# Patient Record
Sex: MALE | Race: BLACK OR AFRICAN AMERICAN
[De-identification: names, ages, dates, MRNs, and addresses within clinical notes are randomized per-mention and may not be internally consistent; named-entity substitution may affect disease eponyms.]

---

## 2017-09-21 ENCOUNTER — HOSPITAL ENCOUNTER (INPATIENT)
Dept: HOSPITAL 74 - YASAS | Age: 32
LOS: 4 days | Discharge: HOME | End: 2017-09-25
Attending: INTERNAL MEDICINE | Admitting: INTERNAL MEDICINE
Payer: COMMERCIAL

## 2017-09-21 VITALS — BODY MASS INDEX: 26.6 KG/M2

## 2017-09-21 DIAGNOSIS — F10.230: Primary | ICD-10-CM

## 2017-09-21 DIAGNOSIS — F17.210: ICD-10-CM

## 2017-09-21 DIAGNOSIS — F16.20: ICD-10-CM

## 2017-09-21 LAB
APPEARANCE UR: CLEAR
BILIRUB UR STRIP.AUTO-MCNC: NEGATIVE MG/DL
COLOR UR: YELLOW
KETONES UR QL STRIP: NEGATIVE
LEUKOCYTE ESTERASE UR QL STRIP.AUTO: NEGATIVE
NITRITE UR QL STRIP: NEGATIVE
PH UR: 5 [PH] (ref 5–8)
PROT UR QL STRIP: NEGATIVE
PROT UR QL STRIP: NEGATIVE
RBC # UR STRIP: NEGATIVE /UL
UROBILINOGEN UR STRIP-MCNC: NEGATIVE MG/DL (ref 0.2–1)

## 2017-09-21 PROCEDURE — HZ2ZZZZ DETOXIFICATION SERVICES FOR SUBSTANCE ABUSE TREATMENT: ICD-10-PCS | Performed by: INTERNAL MEDICINE

## 2017-09-21 RX ADMIN — Medication SCH MG: at 22:20

## 2017-09-21 RX ADMIN — BACITRACIN ZINC SCH GM: 500 OINTMENT TOPICAL at 22:20

## 2017-09-21 NOTE — HP
CIWA Score





- CIWA Score


Nausea/Vomitin-Mild Nausea/No Vomiting


Muscle Tremors: 2


Anxiety: 3


Agitation: 1-Slight > Activity


Paroxysmal Sweats: 2


Orientation: 1-Uncertain about Date


Tacttile Disturbances: 1-Very Mild Itch/Numbness


Auditory Disturbances: 0-None


Visual Disturbances: 1-Very Mild Sensitivity


Headache: 1-Very Mild


CIWA-Ar Total Score: 13





Admission ROS [facility]





- HPI


Chief Complaint: 


WITHDRAWAL SYMPTOMS 


Allergies/Adverse Reactions: 


 Allergies











Allergy/AdvReac Type Severity Reaction Status Date / Time


 


No Known Allergies Allergy   Verified 12 23:39











History of Present Illness: 


32 Y.O. MAN WITH A HISTORY OF ALCOHOL AND PCP DEPENDENCE IS HERE FOR HIS FIRST 

ADMISSION INTO DETOX.  HE WAS LAST HERE FOR REHAB IN . REPORTS HIS LONGEST 

PERIOD SOBER HAS BEEN 1.5 YEARS.  


Exam Limitations: No Limitations





- Ebola screening


Have you traveled outside of the country in the last 21 days: No


Have you had contact with anyone from an Ebola affected area: No


Have you been sick,other than usual withdrawal symptoms: No


Do you have a fever: No





- Review of Systems


Constitutional: Loss of Appetite, Unintentional Wgt. Loss


EENT: reports: Tearing, Nose Congestion


Respiratory: reports: Cough


Cardiac: reports: No Symptoms Reported


GI: reports: No Symptoms Reported


: reports: No Symptoms Reported


Musculoskeletal: reports: Back Pain


Integumentary: reports: No Symptoms Reported


Neuro: reports: Tremors


Endocrine: reports: No Symptoms Reported


Hematology: reports: No Symptoms Reported


Psychiatric: reports: Judgement Intact, Mood/Affect Appropiate


Other Systems: Reviewed and Negative





Patient History





- Patient Medical History


Hx Anemia: No


Hx Asthma: No


Hx Chronic Obstructive Pulmonary Disease (COPD): No


Hx Cancer: No


Hx Cardiac Disorders: No


Hx Congestive Heart Failure: No


Hx Hypertension: No


Hx Hypercholesterolemia: No


Hx Pacemaker: No


HX Cerebrovascular Accident: No


Hx Seizures: No


Hx Dementia: No


Hx Diabetes: No


Hx Gastrointestinal Disorders: No


Hx Liver Disease: No


Hx Genitourinary Disorders: No


Hx Sexually Transmitted Disorders: No


Hx Renal Disease (ESRD): No


Hx Thyroid Disease: No


Hx Human Immunodeficiency Virus (HIV): No


Hx Hepatitis C: No


Hx Depression: No


Hx Suicide Attempt: No


Hx Bipolar Disorder: No


Hx Schizophrenia: No





- Patient Surgical History


Past Surgical History: Yes


Hx Neurologic Surgery: Yes (7 YRS BACK H/O EVACUATION OF SDH.)


Hx Cataract Extraction: No


Hx Cardiac Surgery: No


Hx Lung Surgery: No


Hx Breast Surgery: No


Hx Breast Biopsy: No


Hx Abdominal Surgery: No


Hx Appendectomy: No


Hx Cholecystectomy: No


Hx Genitourinary Surgery: No


Hx  Section: No


Hx Orthopedic Surgery: No


Other Surgical History: surg to left side brain to remove clot; 4 yrs ago


Anesthesia Reaction: No





- PPD History


Previous Implant?: Yes


Documented Results: Negative w/o proof


Date: 13


PPD to be Administered?: Yes





- Reproductive History


Patient is a Female of Child Bearing Age (11 -55 yrs old): No





- Smoking Cessation


Smoking history: Former smoker


Have you smoked in the past 12 months: No


Aproximately how many cigarettes per day: 7


Hx Chewing Tobacco Use: No


Initiated information on smoking cessation: Yes


'Breaking Loose' booklet given: 17





- Substance & Tx. History


Hx Alcohol Use: Yes


Hx Substance Use: Yes


Substance Use Type: Alcohol


Hx Substance Use Treatment: Yes (REHAB: 2013; NEVER BEEN TO DETOX )





- Substances Abused


  ** Alcohol


Route: Oral


Frequency: Daily


Amount used: 6 24OZ CANS OF BEER


Age of first use: 20


Date of Last Use: 17





  ** PCP


Route: Smoking


Frequency: Daily


Amount used: $10


Age of first use: 29


Date of Last Use: 17





Family Disease History





- Family Disease History


Family History: Denies





Admission Physical Exam [facility]





- Vital Signs


Vital Signs: 


 Vital Signs - 24 hr











  17





  19:00


 


Temperature 96.9 F L


 


Pulse Rate 83


 


Respiratory 18





Rate 


 


Blood Pressure 154/82














- Physical


General Appearance: Yes: Within Normal Limits


HEENTM: Yes: Normocephalic, Normal Voice, PHONG


Respiratory: Yes: Chest Non-Tender, Lungs Clear, Normal Breath Sounds, No 

Respiratory Distress, No Accessory Muscle Use


Neck: Yes: No masses,lesions,Nodules, Trachea in good position


Breast: Yes: Breast Exam Deferred


Cardiology: Yes: Regular Rhythm, Regular Rate


Abdominal: Yes: Normal Bowel Sounds, Non Tender, Flat, Soft


Genitourinary: Yes: Other (NO COMPLAINTS REPORTED)


Back: Yes: Normal Inspection


Musculoskeletal: Yes: full range of Motion, Gait Steady


Extremities: Yes: Normal Inspection, Normal Range of Motion, Tremors


Neurological: Yes: Alert, Motor Strength 5/5, Normal Mood/Affect, Normal 

Response


Integumentary: Yes: Normal Color, Dry


Lymphatic: Yes: Within Normal Limits





- Diagnostic


(1) Nicotine dependence


Current Visit: Yes   Status: Chronic   Qualifiers: 


     Nicotine product type: cigarettes     Substance use status: uncomplicated 

       Qualified Code(s): F17.210 - Nicotine dependence, cigarettes, 

uncomplicated  





(2) Alcohol dependence with uncomplicated withdrawal


Current Visit: Yes   Status: Chronic





(3) PCP dependence


Current Visit: Yes   Status: Chronic








Cleared for Admission [facility]





- Detox or Rehab


[facility] Level of Care: Medically Managed


Detox Regimen/Protocol: Librium





BHS Breath Alcohol Content


Breath Alcohol Content: 0.031





Urine Drug Screen





- Results


Drug Screen Negative: No


Urine Drug Screen Results: PCP-Phencyclidine

## 2017-09-22 LAB
ALBUMIN SERPL-MCNC: 3.9 G/DL (ref 3.4–5)
ALP SERPL-CCNC: 58 U/L (ref 45–117)
ALT SERPL-CCNC: 48 U/L (ref 12–78)
ANION GAP SERPL CALC-SCNC: 7 MMOL/L (ref 8–16)
AST SERPL-CCNC: 51 U/L (ref 15–37)
BILIRUB SERPL-MCNC: 0.5 MG/DL (ref 0.2–1)
CALCIUM SERPL-MCNC: 9.1 MG/DL (ref 8.5–10.1)
CO2 SERPL-SCNC: 29 MMOL/L (ref 21–32)
CREAT SERPL-MCNC: 0.7 MG/DL (ref 0.7–1.3)
DEPRECATED RDW RBC AUTO: 14.3 % (ref 11.9–15.9)
GLUCOSE SERPL-MCNC: 78 MG/DL (ref 74–106)
HIV 1 & 2 AB: NEGATIVE
HIV 1 AGP24: NEGATIVE
MCH RBC QN AUTO: 22.6 PG (ref 25.7–33.7)
MCHC RBC AUTO-ENTMCNC: 30.7 G/DL (ref 32–35.9)
MCV RBC: 73.6 FL (ref 80–96)
PLATELET # BLD AUTO: 237 K/MM3 (ref 134–434)
PMV BLD: 9.3 FL (ref 7.5–11.1)
PROT SERPL-MCNC: 6.5 G/DL (ref 6.4–8.2)
SP GR UR: 1.02 (ref 1–1.02)
WBC # BLD AUTO: 6.4 K/MM3 (ref 4–10)

## 2017-09-22 RX ADMIN — Medication SCH MG: at 22:25

## 2017-09-22 RX ADMIN — BACITRACIN ZINC SCH GM: 500 OINTMENT TOPICAL at 10:44

## 2017-09-22 RX ADMIN — Medication SCH TAB: at 10:43

## 2017-09-22 NOTE — PN
[facility] CIWA





- CIWA Score


Nausea/Vomitin-No Nausea/No Vomiting


Muscle Tremors: 4-Moderate,w/Arms Extend


Anxiety: 4-Mod. Anxious/Guarded


Agitation: 4-Moderately Restless


Paroxysmal Sweats: 1-Minimal Palms Moist


Orientation: 0-Oriented


Tacttile Disturbances: 3-Moderate Itch/Numb/Burn


Auditory Disturbances: 0-None


Visual Disturbances: 0-None


Headache: 0-None Present


CIWA-Ar Total Score: 16





BHS Progress Note (SOAP)


Subjective: 


ANXIETY,SWEATS, TREMORS, INTERMITTENT SLEEP.


Objective: 





17 12:40


 Vital Signs











Temperature  96.0 F L  17 10:34


 


Pulse Rate  60   17 10:34


 


Respiratory Rate  18   17 10:34


 


Blood Pressure  116/74   17 10:34


 


O2 Sat by Pulse Oximetry (%)      








 Laboratory Last Values











WBC  6.4 K/mm3 (4.0-10.0)   17  07:40    


 


RBC  5.22 M/mm3 (4.00-5.60)   17  07:40    


 


Hgb  11.8 GM/dL (11.7-16.9)   17  07:40    


 


Hct  38.4 % (35.4-49)   17  07:40    


 


MCV  73.6 fl (80-96)  L  17  07:40    


 


MCH  22.6 pg (25.7-33.7)  L  17  07:40    


 


MCHC  30.7 g/dl (32.0-35.9)  L  17  07:40    


 


RDW  14.3 % (11.9-15.9)   17  07:40    


 


Plt Count  237 K/MM3 (134-434)   17  07:40    


 


MPV  9.3 fl (7.5-11.1)   17  07:40    


 


Sodium  140 mmol/L (136-145)   17  07:40    


 


Potassium  3.9 mmol/L (3.5-5.1)   17  07:40    


 


Chloride  104 mmol/L ()   17  07:40    


 


Carbon Dioxide  29 mmol/L (21-32)   17  07:40    


 


Anion Gap  7  (8-16)  L  17  07:40    


 


BUN  11 mg/dL (7-18)   17  07:40    


 


Creatinine  0.7 mg/dL (0.7-1.3)   17  07:40    


 


Creat Clearance w eGFR  > 60  (>60)   17  07:40    


 


Random Glucose  78 mg/dL ()   17  07:40    


 


Calcium  9.1 mg/dL (8.5-10.1)   17  07:40    


 


Total Bilirubin  0.5 mg/dL (0.2-1.0)  D 17  07:40    


 


AST  51 U/L (15-37)  H  17  07:40    


 


ALT  48 U/L (12-78)   17  07:40    


 


Alkaline Phosphatase  58 U/L ()   17  07:40    


 


Total Protein  6.5 g/dl (6.4-8.2)   17  07:40    


 


Albumin  3.9 g/dl (3.4-5.0)   17  07:40    


 


Urine Color  Yellow   17  21:59    


 


Urine Appearance  Clear   17  21:59    


 


Urine pH  5.0  (5.0-8.0)   17  21:59    


 


Ur Specific Gravity  1.025  (1.005-1.025)   17  21:59    


 


Urine Protein  Negative  (NEGATIVE)   17  21:59    


 


Urine Glucose (UA)  Negative  (NEGATIVE)   17  21:59    


 


Urine Ketones  Negative  (NEGATIVE)   17  21:59    


 


Urine Blood  Negative  (NEGATIVE)   17  21:59    


 


Urine Nitrite  Negative  (NEGATIVE)   17  21:59    


 


Urine Bilirubin  Negative  (NEGATIVE)   17  21:59    


 


Urine Urobilinogen  Negative mg/dL (0.2-1.0)   17  21:59    


 


RPR Titer  Nonreactive  (NONREACTIVE)   17  07:40    


 


HIV 1&2 Antibody Screen  Negative   17  07:40    


 


HIV P24 Antigen  Negative   17  07:40    











Assessment: 





17 12:40


WITHDRAWAL SX


Plan: 


CONTINUE DETOX

## 2017-09-22 NOTE — EKG
Test Reason : 

Blood Pressure : ***/*** mmHG

Vent. Rate : 063 BPM     Atrial Rate : 063 BPM

   P-R Int : 146 ms          QRS Dur : 092 ms

    QT Int : 390 ms       P-R-T Axes : 058 073 057 degrees

   QTc Int : 399 ms

 

NORMAL SINUS RHYTHM

VOLTAGE CRITERIA FOR LEFT VENTRICULAR HYPERTROPHY

ABNORMAL ECG

NO PREVIOUS ECGS AVAILABLE

Confirmed by KOJO SELF MD ([id number]) on 9/22/2017 9:20:13 AM

 

Referred By: Madan Glasgow           Confirmed By:KOJO SELF MD

## 2017-09-23 RX ADMIN — Medication SCH TAB: at 10:40

## 2017-09-23 RX ADMIN — Medication SCH MG: at 22:25

## 2017-09-23 RX ADMIN — BACITRACIN ZINC SCH GM: 500 OINTMENT TOPICAL at 10:40

## 2017-09-23 NOTE — PN
[facility] CIWA





- CIWA Score


Nausea/Vomitin-No Nausea/No Vomiting


Muscle Tremors: 4-Moderate,w/Arms Extend


Anxiety: 4-Mod. Anxious/Guarded


Agitation: 3


Paroxysmal Sweats: 3


Orientation: 0-Oriented


Tacttile Disturbances: 0-None


Auditory Disturbances: 2-Mild Harshness/Frighten


Visual Disturbances: 3-Moderate Sensitivity


Headache: 0-None Present


CIWA-Ar Total Score: 19





BHS Progress Note (SOAP)


Subjective: 


Tremors, Fatigue, Sweating, Anxious.


Objective: 


PT. A & O X 3. NO ACUTE DISTRESS.





17 13:48


 Vital Signs











Temperature  96.5 F L  17 09:46


 


Pulse Rate  65   17 09:46


 


Respiratory Rate  18   17 09:46


 


Blood Pressure  112/43   17 09:46


 


O2 Sat by Pulse Oximetry (%)      








 Laboratory Tests











  17





  21:59 07:40 07:40


 


WBC    6.4


 


RBC    5.22


 


Hgb    11.8


 


Hct    38.4


 


MCV    73.6 L


 


MCH    22.6 L


 


MCHC    30.7 L


 


RDW    14.3


 


Plt Count    237


 


MPV    9.3


 


Sodium   


 


Potassium   


 


Chloride   


 


Carbon Dioxide   


 


Anion Gap   


 


BUN   


 


Creatinine   


 


Creat Clearance w eGFR   


 


Random Glucose   


 


Calcium   


 


Total Bilirubin   


 


AST   


 


ALT   


 


Alkaline Phosphatase   


 


Total Protein   


 


Albumin   


 


Urine Color  Yellow  


 


Urine Appearance  Clear  


 


Urine pH  5.0  


 


Ur Specific Gravity  1.025  


 


Urine Protein  Negative  


 


Urine Glucose (UA)  Negative  


 


Urine Ketones  Negative  


 


Urine Blood  Negative  


 


Urine Nitrite  Negative  


 


Urine Bilirubin  Negative  


 


Urine Urobilinogen  Negative  


 


RPR Titer   


 


Hepatitis C Antibody   0.1 


 


HIV 1&2 Antibody Screen   


 


HIV P24 Antigen   














  17





  07:40 07:40 07:40


 


WBC   


 


RBC   


 


Hgb   


 


Hct   


 


MCV   


 


MCH   


 


MCHC   


 


RDW   


 


Plt Count   


 


MPV   


 


Sodium  140  


 


Potassium  3.9  


 


Chloride  104  


 


Carbon Dioxide  29  


 


Anion Gap  7 L  


 


BUN  11  


 


Creatinine  0.7  


 


Creat Clearance w eGFR  > 60  


 


Random Glucose  78  


 


Calcium  9.1  


 


Total Bilirubin  0.5  D  


 


AST  51 H  


 


ALT  48  


 


Alkaline Phosphatase  58  


 


Total Protein  6.5  


 


Albumin  3.9  


 


Urine Color   


 


Urine Appearance   


 


Urine pH   


 


Ur Specific Gravity   


 


Urine Protein   


 


Urine Glucose (UA)   


 


Urine Ketones   


 


Urine Blood   


 


Urine Nitrite   


 


Urine Bilirubin   


 


Urine Urobilinogen   


 


RPR Titer   Nonreactive 


 


Hepatitis C Antibody   


 


HIV 1&2 Antibody Screen   Negative  Negative


 


HIV P24 Antigen   Negative  Negative








LABS NOTED.


Assessment: 





17 13:49


WITHDRAWAL SYMPTOMS.


Plan: 


CONTINUE DETOX.


INCREASE DAILY PO FLUID INTAKE.

## 2017-09-24 RX ADMIN — Medication SCH MG: at 22:33

## 2017-09-24 RX ADMIN — BACITRACIN ZINC SCH: 500 OINTMENT TOPICAL at 11:05

## 2017-09-24 RX ADMIN — Medication SCH: at 11:05

## 2017-09-24 NOTE — PN
[facility] Progress Note (SOAP)


Subjective: 


Anxious, nausea, sweating, interrupted sleep


Objective: 





09/24/17 14:54


 Last Vital Signs











Temp Pulse Resp BP Pulse Ox


 


 98.3 F   84   18   133/60    


 


 09/24/17 13:52  09/24/17 13:52  09/24/17 13:52  09/24/17 13:52   








 Laboratory Tests











  09/21/17 09/22/17 09/22/17





  21:59 07:40 07:40


 


WBC    6.4


 


RBC    5.22


 


Hgb    11.8


 


Hct    38.4


 


MCV    73.6 L


 


MCH    22.6 L


 


MCHC    30.7 L


 


RDW    14.3


 


Plt Count    237


 


MPV    9.3


 


Sodium   


 


Potassium   


 


Chloride   


 


Carbon Dioxide   


 


Anion Gap   


 


BUN   


 


Creatinine   


 


Creat Clearance w eGFR   


 


Random Glucose   


 


Calcium   


 


Total Bilirubin   


 


AST   


 


ALT   


 


Alkaline Phosphatase   


 


Total Protein   


 


Albumin   


 


Urine Color  Yellow  


 


Urine Appearance  Clear  


 


Urine pH  5.0  


 


Ur Specific Gravity  1.025  


 


Urine Protein  Negative  


 


Urine Glucose (UA)  Negative  


 


Urine Ketones  Negative  


 


Urine Blood  Negative  


 


Urine Nitrite  Negative  


 


Urine Bilirubin  Negative  


 


Urine Urobilinogen  Negative  


 


RPR Titer   


 


Hepatitis C Antibody   0.1 


 


HIV 1&2 Antibody Screen   


 


HIV P24 Antigen   














  09/22/17 09/22/17 09/22/17





  07:40 07:40 07:40


 


WBC   


 


RBC   


 


Hgb   


 


Hct   


 


MCV   


 


MCH   


 


MCHC   


 


RDW   


 


Plt Count   


 


MPV   


 


Sodium  140  


 


Potassium  3.9  


 


Chloride  104  


 


Carbon Dioxide  29  


 


Anion Gap  7 L  


 


BUN  11  


 


Creatinine  0.7  


 


Creat Clearance w eGFR  > 60  


 


Random Glucose  78  


 


Calcium  9.1  


 


Total Bilirubin  0.5  D  


 


AST  51 H  


 


ALT  48  


 


Alkaline Phosphatase  58  


 


Total Protein  6.5  


 


Albumin  3.9  


 


Urine Color   


 


Urine Appearance   


 


Urine pH   


 


Ur Specific Gravity   


 


Urine Protein   


 


Urine Glucose (UA)   


 


Urine Ketones   


 


Urine Blood   


 


Urine Nitrite   


 


Urine Bilirubin   


 


Urine Urobilinogen   


 


RPR Titer   Nonreactive 


 


Hepatitis C Antibody   


 


HIV 1&2 Antibody Screen   Negative  Negative


 


HIV P24 Antigen   Negative  Negative








Labs noted


Assessment: 





09/24/17 14:55


Withdrawal symptoms


Plan: 


Continue detox

## 2017-09-25 VITALS — SYSTOLIC BLOOD PRESSURE: 103 MMHG | TEMPERATURE: 96.7 F | HEART RATE: 55 BPM | DIASTOLIC BLOOD PRESSURE: 64 MMHG

## 2017-09-25 NOTE — DS
[facility] Detox Discharge Summary


Admission Date: 


09/21/17





Discharge Date: 09/25/17





- History


Present History: Alcohol Dependence, Pcp Dependence


Pertinent Past History: 


Denies





- Physical Exam Results


Vital Signs: 


 Vital Signs











Temperature  96.7 F L  09/25/17 06:16


 


Pulse Rate  55 L  09/25/17 06:16


 


Respiratory Rate  16   09/25/17 06:16


 


Blood Pressure  103/64   09/25/17 06:16


 


O2 Sat by Pulse Oximetry (%)      











Pertinent Admission Physical Exam Findings: 


Withdrawal sx


 Laboratory Last Values











WBC  6.4 K/mm3 (4.0-10.0)   09/22/17  07:40    


 


RBC  5.22 M/mm3 (4.00-5.60)   09/22/17  07:40    


 


Hgb  11.8 GM/dL (11.7-16.9)   09/22/17  07:40    


 


Hct  38.4 % (35.4-49)   09/22/17  07:40    


 


MCV  73.6 fl (80-96)  L  09/22/17  07:40    


 


MCH  22.6 pg (25.7-33.7)  L  09/22/17  07:40    


 


MCHC  30.7 g/dl (32.0-35.9)  L  09/22/17  07:40    


 


RDW  14.3 % (11.9-15.9)   09/22/17  07:40    


 


Plt Count  237 K/MM3 (134-434)   09/22/17  07:40    


 


MPV  9.3 fl (7.5-11.1)   09/22/17  07:40    


 


Sodium  140 mmol/L (136-145)   09/22/17  07:40    


 


Potassium  3.9 mmol/L (3.5-5.1)   09/22/17  07:40    


 


Chloride  104 mmol/L ()   09/22/17  07:40    


 


Carbon Dioxide  29 mmol/L (21-32)   09/22/17  07:40    


 


Anion Gap  7  (8-16)  L  09/22/17  07:40    


 


BUN  11 mg/dL (7-18)   09/22/17  07:40    


 


Creatinine  0.7 mg/dL (0.7-1.3)   09/22/17  07:40    


 


Creat Clearance w eGFR  > 60  (>60)   09/22/17  07:40    


 


Random Glucose  78 mg/dL ()   09/22/17  07:40    


 


Calcium  9.1 mg/dL (8.5-10.1)   09/22/17  07:40    


 


Total Bilirubin  0.5 mg/dL (0.2-1.0)  D 09/22/17  07:40    


 


AST  51 U/L (15-37)  H  09/22/17  07:40    


 


ALT  48 U/L (12-78)   09/22/17  07:40    


 


Alkaline Phosphatase  58 U/L ()   09/22/17  07:40    


 


Total Protein  6.5 g/dl (6.4-8.2)   09/22/17  07:40    


 


Albumin  3.9 g/dl (3.4-5.0)   09/22/17  07:40    


 


Urine Color  Yellow   09/21/17  21:59    


 


Urine Appearance  Clear   09/21/17  21:59    


 


Urine pH  5.0  (5.0-8.0)   09/21/17  21:59    


 


Ur Specific Gravity  1.025  (1.005-1.025)   09/21/17  21:59    


 


Urine Protein  Negative  (NEGATIVE)   09/21/17  21:59    


 


Urine Glucose (UA)  Negative  (NEGATIVE)   09/21/17  21:59    


 


Urine Ketones  Negative  (NEGATIVE)   09/21/17  21:59    


 


Urine Blood  Negative  (NEGATIVE)   09/21/17  21:59    


 


Urine Nitrite  Negative  (NEGATIVE)   09/21/17  21:59    


 


Urine Bilirubin  Negative  (NEGATIVE)   09/21/17  21:59    


 


Urine Urobilinogen  Negative mg/dL (0.2-1.0)   09/21/17  21:59    


 


RPR Titer  Nonreactive  (NONREACTIVE)   09/22/17  07:40    


 


Hepatitis C Antibody  0.1 s/co ratio (0.0-0.9)   09/22/17  07:40    


 


HIV 1&2 Antibody Screen  Negative   09/22/17  07:40    


 


HIV P24 Antigen  Negative   09/22/17  07:40    








Labs noted





- Treatment


Hospital Course: Detox Protocol Followed, Detoxed Safely, Responded well, 

Discharged Condition Good, Rehab Referral Accepted


Patient has Accepted a Rehab Referral to: [facility]





- Medication


Discharge Medications: 


Ambulatory Orders





NK [No Known Home Medication]  09/21/17 











- Diagnosis


(1) Alcohol dependence with uncomplicated withdrawal


Current Visit: Yes   Status: Acute





(2) Nicotine dependence


Current Visit: Yes   Status: Acute   Qualifiers: 


     Nicotine product type: cigarettes     Substance use status: uncomplicated 

       Qualified Code(s): F17.210 - Nicotine dependence, cigarettes, 

uncomplicated  





(3) PCP dependence


Current Visit: Yes   Status: Acute








- AMA


Did Patient Leave Against Medical Advice: No

## 2019-09-16 ENCOUNTER — HOSPITAL ENCOUNTER (INPATIENT)
Dept: HOSPITAL 74 - YASAS | Age: 34
LOS: 4 days | Discharge: HOME | End: 2019-09-20
Attending: SURGERY | Admitting: SURGERY
Payer: COMMERCIAL

## 2019-09-16 VITALS — BODY MASS INDEX: 23.8 KG/M2

## 2019-09-16 DIAGNOSIS — F10.230: Primary | ICD-10-CM

## 2019-09-16 DIAGNOSIS — R94.5: ICD-10-CM

## 2019-09-16 DIAGNOSIS — F12.20: ICD-10-CM

## 2019-09-16 DIAGNOSIS — F16.20: ICD-10-CM

## 2019-09-16 DIAGNOSIS — F17.210: ICD-10-CM

## 2019-09-16 PROCEDURE — HZ2ZZZZ DETOXIFICATION SERVICES FOR SUBSTANCE ABUSE TREATMENT: ICD-10-PCS | Performed by: ALLERGY & IMMUNOLOGY

## 2019-09-16 RX ADMIN — Medication SCH MG: at 22:11

## 2019-09-16 NOTE — HP
CIWA Score


Nausea/Vomitin-No Nausea/No Vomiting


Muscle Tremors: None


Anxiety: 0-No Anxiety, at Ease


Agitation: 0-Normal Activity


Paroxysmal Sweats: No Perspiration


Orientation: 0-Oriented


Tacttile Disturbances: 0-None


Auditory Disturbances: 0-None


Visual Disturbances: 0-None


Headache: 0-None Present


CIWA-Ar Total Score: 0





- Admission Criteria


[facility] Guidelines: Admission for Medically Managed Detox: 


Requires at least one of the followin. CIWA greater than 12


2. Seizures within the past 24 hours


3. Delirium tremens within the past 24 hours


4. Hallucinations within the past 24 hours


5. Acute intervention needed for co  occurring medical disorder


6. Acute intervention needed for co  occurring psychiatric disorder


7. Severe withdrawal that cannot be handled at a lower level of care (continued


    vomiting, continued diarrhea, abnormal vital signs) requiring intravenous


    medication and/or fluids


8. Pregnancy








Admission ROS [facility]





- [facility]


Chief Complaint: 





detox from EtOH, rehab for MJ and PCP


Allergies/Adverse Reactions: 


 Allergies











Allergy/AdvReac Type Severity Reaction Status Date / Time


 


No Known Allergies Allergy   Verified 19 15:14











History of Present Illness: 





34M w/ no significant pmh presenting for detox-rehab for EtOH, PCP, MJ. Drinks 

40oz x6 beers, since 19yo. Drinks first thing in the morning. Last drink at ~

0830. Blacked out x7. Has had Head CT 1 ys, no ICH. Denies seizures. Smokes PCP 

daily. Completed long-term rehab for 12mo, ~. Smokes 7cigarettes. Lives 

temporarily with mother and setpfather. Gets money from family. Incarcerated 

for street-fight. Not on probation. 








- Ebola screening


Have you traveled outside of the country in the last 21 days: No


Have you had contact with anyone from an Ebola affected area: No


Do you have a fever: No





- Review of Systems


EENT: denies: Blurred Vision, Double Vision, Nose Congestion


Respiratory: denies: Cough, Hemoptysis


Cardiac: denies: Chest Pain, Irregular Heart Rate, Palpitations


GI: reports: Constipated.  denies: Abdominal Distended, Diarrhea, Nausea, 

Vomiting


Neuro: denies: Headache, Tremors, Ataxia, Dizziness





Patient History





- Patient Medical History


Hx Anemia: No


Hx Asthma: No


Hx Chronic Obstructive Pulmonary Disease (COPD): No


Hx Cancer: No


Hx Cardiac Disorders: No


Hx Congestive Heart Failure: No


Hx Hypertension: No


Hx Hypercholesterolemia: No


Hx Pacemaker: No


HX Cerebrovascular Accident: No


Hx Seizures: No


Hx Dementia: No


Hx Diabetes: No


Hx Gastrointestinal Disorders: No


Hx Liver Disease: No


Hx Genitourinary Disorders: No


Hx Sexually Transmitted Disorders: No


Hx Renal Disease (ESRD): No


Hx Thyroid Disease: No


Hx Human Immunodeficiency Virus (HIV): No


Hx Hepatitis C: No


Hx Depression: No


Hx Suicide Attempt: No


Hx Bipolar Disorder: No


Hx Schizophrenia: No





- Patient Surgical History


Past Surgical History: Yes


Hx Neurologic Surgery: Yes (7 YRS BACK H/O EVACUATION OF SDH.)


Hx Cataract Extraction: No


Hx Cardiac Surgery: No


Hx Lung Surgery: No


Hx Breast Surgery: No


Hx Breast Biopsy: No


Hx Abdominal Surgery: No


Hx Appendectomy: No


Hx Cholecystectomy: No


Hx Genitourinary Surgery: No


Hx  Section: No


Hx Orthopedic Surgery: No


Other Surgical History: surg to left side brain to remove clot; 4 yrs ago


Anesthesia Reaction: No





- PPD History


Date: 17





- Smoking Cessation


Smoking history: Former smoker


Have you smoked in the past 12 months: No


Aproximately how many cigarettes per day: 7


Hx Chewing Tobacco Use: No


Initiated information on smoking cessation: No





- Substances abused


  ** Alcohol


Substance route: Oral


Frequency: Daily


Amount used: (6) 40oz beers


Age of first use: 18


Date of last use: 18





  ** Marijuana/Hashish


Substance route: Smoking


Frequency: 3-6 times per week


Amount used: $20


Age of first use: 18


Date of last use: 18





  ** PCP


Substance route: Smoking


Frequency: Daily


Amount used: 3 blunts


Age of first use: 26


Date of last use: 09/15/19





Admission Physical Exam [facility]





- Vital Signs


Vital Signs: 


 Vital Signs - 24 hr











  19





  15:17


 


Temperature 97.1 F L


 


Pulse Rate 78


 


Respiratory 20





Rate 


 


Blood Pressure 128/70














- Physical


General Appearance: Yes: No Apparent Distress, Irritable.  No: Cachetic, Anxious


HEENTM: No: Scleral Ictenus R, Scleral Ictenus L


Respiratory: Yes: Chest Non-Tender, Lungs Clear, No Accessory Muscle Use.  No: 

Rhonchi, Wheezing


Neck: Yes: Supple, Trachea in good position


Cardiology: Yes: Regular Rhythm, Regular Rate, S1, S2.  No: Bradycardia, 

Irregularly Irregular


Abdominal: Yes: Soft, Hernia (1-2cm umbilical hernia, partially reducible).  No

: Guarding, Rebound, Tenderness


Musculoskeletal: Yes: full range of Motion


Extremities: Yes: Normal Capillary Refill, Normal Inspection


Neurological: Yes: Fully Oriented, Alert





Breathalyzer





- Breathalyzer


Breathalyzer: 0





Urine Drug Screen





- Test Device


Lot number: [id number]


Expiration date: 21





- Control


Is test valid?: Yes





- Results


Drug screen NEGATIVE: No





Inpatient Rehab Admission





- Rehab Decision to Admit


Inpatient rehab admission?: No

## 2019-09-16 NOTE — PN
Teaching Attending Note


Name of Resident: Melquiades Link





ATTENDING PHYSICIAN STATEMENT





I saw and evaluated the patient.


I reviewed the resident's note and discussed the case with the resident.


I agree with the resident's findings and plan as documented.








SUBJECTIVE: 33 yo with h/o alcohol use disorder- here for detox. Pt was 

referred here from outpt alcohol program. LEANDRE- 0.88








OBJECTIVE:


 Vital Signs - 24 hr











  09/16/19





  15:17


 


Temperature 97.1 F L


 


Pulse Rate 78


 


Respiratory 20





Rate 


 


Blood Pressure 128/70








tremulous


anxious








ASSESSMENT AND PLAN:


Alcohol use disorder- start [facility] detox protocol

## 2019-09-17 LAB
ALBUMIN SERPL-MCNC: 3.7 G/DL (ref 3.4–5)
ALP SERPL-CCNC: 56 U/L (ref 45–117)
ALT SERPL-CCNC: 60 U/L (ref 13–61)
ANION GAP SERPL CALC-SCNC: 5 MMOL/L (ref 8–16)
AST SERPL-CCNC: 49 U/L (ref 15–37)
BILIRUB SERPL-MCNC: 0.8 MG/DL (ref 0.2–1)
BUN SERPL-MCNC: 11.4 MG/DL (ref 7–18)
CALCIUM SERPL-MCNC: 9.1 MG/DL (ref 8.5–10.1)
CHLORIDE SERPL-SCNC: 105 MMOL/L (ref 98–107)
CO2 SERPL-SCNC: 29 MMOL/L (ref 21–32)
CREAT SERPL-MCNC: 0.7 MG/DL (ref 0.55–1.3)
DEPRECATED RDW RBC AUTO: 15.6 % (ref 11.9–15.9)
GLUCOSE SERPL-MCNC: 86 MG/DL (ref 74–106)
HCT VFR BLD CALC: 37.1 % (ref 35.4–49)
HGB BLD-MCNC: 11.7 GM/DL (ref 11.7–16.9)
MCH RBC QN AUTO: 22.8 PG (ref 25.7–33.7)
MCHC RBC AUTO-ENTMCNC: 31.5 G/DL (ref 32–35.9)
MCV RBC: 72.1 FL (ref 80–96)
PLATELET # BLD AUTO: 227 K/MM3 (ref 134–434)
PMV BLD: 9.1 FL (ref 7.5–11.1)
POTASSIUM SERPLBLD-SCNC: 4.1 MMOL/L (ref 3.5–5.1)
PROT SERPL-MCNC: 6.6 G/DL (ref 6.4–8.2)
RBC # BLD AUTO: 5.15 M/MM3 (ref 4–5.6)
SODIUM SERPL-SCNC: 139 MMOL/L (ref 136–145)
WBC # BLD AUTO: 5.3 K/MM3 (ref 4–10)

## 2019-09-17 RX ADMIN — BACITRACIN ZINC SCH: 500 OINTMENT TOPICAL at 22:00

## 2019-09-17 RX ADMIN — Medication SCH MG: at 21:59

## 2019-09-17 RX ADMIN — Medication SCH TAB: at 10:10

## 2019-09-17 RX ADMIN — Medication PRN MG: at 22:00

## 2019-09-17 RX ADMIN — BACITRACIN ZINC SCH APPLIC: 500 OINTMENT TOPICAL at 13:11

## 2019-09-17 NOTE — PN
S CIWA





- CIWA Score


Nausea/Vomitin


Muscle Tremors: 2


Anxiety: 2


Agitation: 2


Paroxysmal Sweats: No Perspiration


Orientation: 0-Oriented


Tacttile Disturbances: 1-Very Mild Itch/Numbness


Auditory Disturbances: 0-None


Visual Disturbances: 0-None


Headache: 2-Mild


CIWA-Ar Total Score: 11





[facility] Progress Note (SOAP)


Subjective: 





alert,irritable,anxious,interrupted sleep,tremor,abrasion of right thumb


Objective: 





19 09:51


 Vital Signs











Temperature  97.4 F L  19 09:08


 


Pulse Rate  70   19 09:08


 


Respiratory Rate  18   19 09:08


 


Blood Pressure  100/56 L  19 09:08


 


O2 Sat by Pulse Oximetry (%)      











19 09:51


 Laboratory Last Values











TB (QFT) Incubation  Cancelled   19  08:00    


 


TB Test (QFT) Nil  Cancelled   19  08:00    


 


TB Test (QFT) Mitogen  Cancelled   19  08:00    


 


TB Test (QFT) Antigen  Cancelled   19  08:00    


 


TB Test (QFT)  Cancelled   19  08:00    


 


TB Positive Criteria  Cancelled   19  08:00    


 


TB Test (QFT) Interp  Cancelled   19  08:00    








labs pending


Assessment: 





19 09:52


withdrawal symptom


Plan: 





continue detox librium regimen

## 2019-09-18 RX ADMIN — BACITRACIN ZINC SCH: 500 OINTMENT TOPICAL at 22:01

## 2019-09-18 RX ADMIN — BACITRACIN ZINC SCH APPLIC: 500 OINTMENT TOPICAL at 10:05

## 2019-09-18 RX ADMIN — Medication SCH TAB: at 10:05

## 2019-09-18 RX ADMIN — Medication PRN MG: at 22:01

## 2019-09-18 RX ADMIN — Medication SCH MG: at 22:01

## 2019-09-18 NOTE — PN
[facility] CIWA





- CIWA Score


Nausea/Vomitin-Mild Nausea/No Vomiting


Muscle Tremors: 3


Anxiety: 2


Agitation: 2


Paroxysmal Sweats: 1-Minimal Palms Moist


Orientation: 0-Oriented


Tacttile Disturbances: 1-Very Mild Itch/Numbness


Auditory Disturbances: 0-None


Visual Disturbances: 0-None


Headache: 0-None Present


CIWA-Ar Total Score: 10





BHS Progress Note (SOAP)


Subjective: 





doing well with librium detox regimen 


feeling tired resting on bed prefers to stay in bed today


limited conversation with staff


denies depression but tired


Objective: 





19 14:03


 Vital Signs











Temperature  96.2 F L  19 13:06


 


Pulse Rate  63   19 13:06


 


Respiratory Rate  18   19 13:06


 


Blood Pressure  129/78   19 13:06


 


O2 Sat by Pulse Oximetry (%)      








 Laboratory Last Values











WBC  5.3 K/mm3 (4.0-10.0)   19  08:00    


 


RBC  5.15 M/mm3 (4.00-5.60)   19  08:00    


 


Hgb  11.7 GM/dL (11.7-16.9)   19  08:00    


 


Hct  37.1 % (35.4-49)   19  08:00    


 


MCV  72.1 fl (80-96)  L  19  08:00    


 


MCH  22.8 pg (25.7-33.7)  L  19  08:00    


 


MCHC  31.5 g/dl (32.0-35.9)  L  19  08:00    


 


RDW  15.6 % (11.9-15.9)   19  08:00    


 


Plt Count  227 K/MM3 (134-434)   19  08:00    


 


MPV  9.1 fl (7.5-11.1)   19  08:00    


 


Sodium  139 mmol/L (136-145)   19  08:00    


 


Potassium  4.1 mmol/L (3.5-5.1)   19  08:00    


 


Chloride  105 mmol/L ()   19  08:00    


 


Carbon Dioxide  29 mmol/L (21-32)   19  08:00    


 


Anion Gap  5 MMOL/L (8-16)  L  19  08:00    


 


BUN  11.4 mg/dL (7-18)   19  08:00    


 


Creatinine  0.7 mg/dL (0.55-1.3)   19  08:00    


 


Est GFR (CKD-EPI)AfAm  141.70   19  08:00    


 


Est GFR (CKD-EPI)NonAf  122.26   19  08:00    


 


Random Glucose  86 mg/dL ()   19  08:00    


 


Calcium  9.1 mg/dL (8.5-10.1)   19  08:00    


 


Total Bilirubin  0.8 mg/dL (0.2-1)   19  08:00    


 


AST  49 U/L (15-37)  H  19  08:00    


 


ALT  60 U/L (13-61)   19  08:00    


 


Alkaline Phosphatase  56 U/L ()   19  08:00    


 


Total Protein  6.6 g/dl (6.4-8.2)   19  08:00    


 


Albumin  3.7 g/dl (3.4-5.0)   19  08:00    


 


RPR Titer  Nonreactive  (NONREACTIVE)   19  08:00    


 


TB (QFT) Incubation  Cancelled   19  08:00    


 


TB Test (QFT) Nil  Cancelled   19  08:00    


 


TB Test (QFT) Mitogen  Cancelled   19  08:00    


 


TB Test (QFT) Antigen  Cancelled   19  08:00    


 


TB Test (QFT)  Cancelled   19  08:00    


 


TB Positive Criteria  Cancelled   19  08:00    


 


TB Test (QFT) Interp  Cancelled   19  08:00    








lab noted


Assessment: 





19 14:03


alcohol withdrawal sx


Plan: 





continue librium detox

## 2019-09-19 RX ADMIN — BACITRACIN ZINC SCH: 500 OINTMENT TOPICAL at 21:59

## 2019-09-19 RX ADMIN — Medication SCH MG: at 21:59

## 2019-09-19 RX ADMIN — Medication SCH TAB: at 10:08

## 2019-09-19 RX ADMIN — BACITRACIN ZINC SCH APPLIC: 500 OINTMENT TOPICAL at 10:08

## 2019-09-19 RX ADMIN — Medication PRN MG: at 21:59

## 2019-09-19 NOTE — PN
BHS COWS





- Scale


Resting Pulse: 0= MI 80 or Below


Sweatin= Chills/Flushing


Restless Observation: 1= Difficult to Sit Still


Pupil Size: 0= Normal to Room Light


Bone or Joint Aches: 1= Mild Discomfort


Runny Nose/ Eye Tearin= Nasal Congestion


GI Upset > 30mins: 1= Stomach Cramp


Tremor Observation of Outstretched Hands: 1= Tremor Felt, Not Seen


Yawning Observation: 1= 1-2x During Session


Anxiety or Irritability: 1=Feels Anxious/Irritable


Goose Flesh Skin: 0=Smooth Skin


COWS Score: 8





BHS Progress Note (SOAP)


Subjective: 





doing well with librium detox regimen


ate 90% breakfast resting on bed comfortably


less tremor mild anxiety


Objective: 





19 09:12


 Vital Signs











Temperature  97.8 F   19 06:17


 


Pulse Rate  49 L  19 06:17


 


Respiratory Rate  18   19 06:17


 


Blood Pressure  116/68   19 06:17


 


O2 Sat by Pulse Oximetry (%)      








 Laboratory Last Values











WBC  5.3 K/mm3 (4.0-10.0)   19  08:00    


 


RBC  5.15 M/mm3 (4.00-5.60)   19  08:00    


 


Hgb  11.7 GM/dL (11.7-16.9)   19  08:00    


 


Hct  37.1 % (35.4-49)   19  08:00    


 


MCV  72.1 fl (80-96)  L  19  08:00    


 


MCH  22.8 pg (25.7-33.7)  L  19  08:00    


 


MCHC  31.5 g/dl (32.0-35.9)  L  19  08:00    


 


RDW  15.6 % (11.9-15.9)   19  08:00    


 


Plt Count  227 K/MM3 (134-434)   19  08:00    


 


MPV  9.1 fl (7.5-11.1)   19  08:00    


 


Sodium  139 mmol/L (136-145)   19  08:00    


 


Potassium  4.1 mmol/L (3.5-5.1)   19  08:00    


 


Chloride  105 mmol/L ()   19  08:00    


 


Carbon Dioxide  29 mmol/L (21-32)   19  08:00    


 


Anion Gap  5 MMOL/L (8-16)  L  19  08:00    


 


BUN  11.4 mg/dL (7-18)   19  08:00    


 


Creatinine  0.7 mg/dL (0.55-1.3)   19  08:00    


 


Est GFR (CKD-EPI)AfAm  141.70   19  08:00    


 


Est GFR (CKD-EPI)NonAf  122.26   19  08:00    


 


Random Glucose  86 mg/dL ()   19  08:00    


 


Calcium  9.1 mg/dL (8.5-10.1)   19  08:00    


 


Total Bilirubin  0.8 mg/dL (0.2-1)   19  08:00    


 


AST  49 U/L (15-37)  H  19  08:00    


 


ALT  60 U/L (13-61)   19  08:00    


 


Alkaline Phosphatase  56 U/L ()   19  08:00    


 


Total Protein  6.6 g/dl (6.4-8.2)   19  08:00    


 


Albumin  3.7 g/dl (3.4-5.0)   19  08:00    


 


RPR Titer  Nonreactive  (NONREACTIVE)   19  08:00    


 


HIV 1&2 Ag/Ab, 4th Gen  Non reactive  (Non Reactive)   19  08:00    


 


TB (QFT) Incubation  Cancelled   19  08:00    


 


TB Test (QFT) Nil  Cancelled   19  08:00    


 


TB Test (QFT) Mitogen  Cancelled   19  08:00    


 


TB Test (QFT) Antigen  Cancelled   19  08:00    


 


TB Test (QFT)  Cancelled   19  08:00    


 


TB Positive Criteria  Cancelled   19  08:00    


 


TB Test (QFT) Interp  Cancelled   19  08:00    








lab noted


QFT blood sample received as ordered  19


result pending


19 09:14





Assessment: 





19 09:16


alcohol withdrawal sx


Plan: 





continue librium detox regimen

## 2019-09-20 VITALS — HEART RATE: 66 BPM | DIASTOLIC BLOOD PRESSURE: 72 MMHG | SYSTOLIC BLOOD PRESSURE: 112 MMHG | TEMPERATURE: 96.8 F

## 2019-09-20 RX ADMIN — BACITRACIN ZINC SCH: 500 OINTMENT TOPICAL at 09:45

## 2019-09-20 RX ADMIN — Medication SCH TAB: at 09:45

## 2019-09-20 NOTE — DS
[facility] Detox Discharge Summary


Admission Date: 


19





Discharge Date: 19





- History


Present History: Alcohol Dependence, Pcp Dependence


Additional Comments: 





SINCE NO BEDS ARE AVAILABLE IN [facility] AT THIS TIME, PATIENT TO 

RETURN HOME FOR THE WEEK. THEN HE WILL APPLY FOR ADMISSION TO [facility] 

[facility] ON MONDAY, 2019. PATIENT WAS DISCHARGED FROM DETOX UNIT IN STABLE 

MEDICAL CONDITION.


Pertinent Past History: 





Nicotine Dependence, Elevated AST Level.





- Physical Exam Results


Vital Signs: 


 Vital Signs











Temperature  96.8 F L  19 09:19


 


Pulse Rate  66   19 09:19


 


Respiratory Rate  20   19 09:19


 


Blood Pressure  112/72   19 09:19


 


O2 Sat by Pulse Oximetry (%)      











Pertinent Admission Physical Exam Findings: 





WITHDRAWAL SYMPTOMS.





 Laboratory Tests











  19





  08:00 08:00 08:00


 


WBC  5.3  


 


RBC  5.15  


 


Hgb  11.7  


 


Hct  37.1  


 


MCV  72.1 L  


 


MCH  22.8 L  


 


MCHC  31.5 L  


 


RDW  15.6  


 


Plt Count  227  


 


MPV  9.1  


 


Sodium   139 


 


Potassium   4.1 


 


Chloride   105 


 


Carbon Dioxide   29 


 


Anion Gap   5 L 


 


BUN   11.4 


 


Creatinine   0.7 


 


Est GFR (CKD-EPI)AfAm   141.70 


 


Est GFR (CKD-EPI)NonAf   122.26 


 


Random Glucose   86 


 


Calcium   9.1 


 


Total Bilirubin   0.8 


 


AST   49 H 


 


ALT   60 


 


Alkaline Phosphatase   56 


 


Total Protein   6.6 


 


Albumin   3.7 


 


RPR Titer   


 


C. trachomatis (JONATAN)   


 


HIV 1&2 Ag/Ab, 4th Gen   


 


N. gonorrhoeae (JONATAN)   


 


TB (QFT) Incubation    Cancelled


 


TB Test (QFT) Nil    Cancelled


 


TB Test (QFT) Mitogen    Cancelled


 


TB Test (QFT) Antigen    Cancelled


 


TB Test (QFT)    Cancelled


 


TB Positive Criteria    Cancelled


 


TB Test (QFT) Interp    Cancelled














  19





  08:00 08:00 12:25


 


WBC   


 


RBC   


 


Hgb   


 


Hct   


 


MCV   


 


MCH   


 


MCHC   


 


RDW   


 


Plt Count   


 


MPV   


 


Sodium   


 


Potassium   


 


Chloride   


 


Carbon Dioxide   


 


Anion Gap   


 


BUN   


 


Creatinine   


 


Est GFR (CKD-EPI)AfAm   


 


Est GFR (CKD-EPI)NonAf   


 


Random Glucose   


 


Calcium   


 


Total Bilirubin   


 


AST   


 


ALT   


 


Alkaline Phosphatase   


 


Total Protein   


 


Albumin   


 


RPR Titer  Nonreactive  


 


C. trachomatis (JONATAN)   Cancelled 


 


HIV 1&2 Ag/Ab, 4th Gen   Non reactive 


 


N. gonorrhoeae (JONATAN)   Cancelled 


 


TB (QFT) Incubation    


 


TB Test (QFT) Nil    0.03


 


TB Test (QFT) Mitogen    >10.00


 


TB Test (QFT) Antigen    0.02


 


TB Test (QFT)    Negative


 


TB Positive Criteria    


 


TB Test (QFT) Interp   








LABS NOTED.





- Treatment


Hospital Course: Detox Protocol Followed, Detoxed Safely, Responded well, 

Discharged Condition Good, Rehab Referral Accepted


Patient has Accepted a Rehab Referral to: [facility] (WILL RETURN 

TO APPLY ON 2019).





- Medication


Discharge Medications: 


Ambulatory Orders





NK [No Known Home Medication]  17 











- Diagnosis


(1) Nicotine dependence


Status: Acute   


Qualifiers: 


   Nicotine product type: cigarettes   Substance use status: uncomplicated   

Qualified Code(s): F17.210 - Nicotine dependence, cigarettes, uncomplicated   





(2) PCP dependence


Status: Acute   





(3) Alcohol dependence with uncomplicated withdrawal


Status: Acute   





- AMA


Did Patient Leave Against Medical Advice: No





[facility] CIWA





- CIWA Score


Nausea/Vomitin-No Nausea/No Vomiting


Muscle Tremors: None


Anxiety: 2


Agitation: 2


Paroxysmal Sweats: No Perspiration


Orientation: 0-Oriented


Tacttile Disturbances: 0-None


Auditory Disturbances: 0-None


Visual Disturbances: 0-None


Headache: 0-None Present


CIWA-Ar Total Score: 4

## 2019-10-14 ENCOUNTER — HOSPITAL ENCOUNTER (INPATIENT)
Dept: HOSPITAL 74 - YASAS | Age: 34
LOS: 4 days | Discharge: HOME | End: 2019-10-18
Attending: ALLERGY & IMMUNOLOGY | Admitting: ALLERGY & IMMUNOLOGY
Payer: COMMERCIAL

## 2019-10-14 VITALS — BODY MASS INDEX: 25 KG/M2

## 2019-10-14 DIAGNOSIS — H55.00: ICD-10-CM

## 2019-10-14 DIAGNOSIS — F17.210: ICD-10-CM

## 2019-10-14 DIAGNOSIS — F12.21: ICD-10-CM

## 2019-10-14 DIAGNOSIS — Z91.89: ICD-10-CM

## 2019-10-14 DIAGNOSIS — F10.230: Primary | ICD-10-CM

## 2019-10-14 DIAGNOSIS — Z87.09: ICD-10-CM

## 2019-10-14 DIAGNOSIS — R05: ICD-10-CM

## 2019-10-14 DIAGNOSIS — F16.21: ICD-10-CM

## 2019-10-14 DIAGNOSIS — R50.9: ICD-10-CM

## 2019-10-14 PROCEDURE — HZ2ZZZZ DETOXIFICATION SERVICES FOR SUBSTANCE ABUSE TREATMENT: ICD-10-PCS | Performed by: ALLERGY & IMMUNOLOGY

## 2019-10-14 RX ADMIN — GUAIFENESIN SCH ML: 100 SOLUTION ORAL at 23:11

## 2019-10-14 RX ADMIN — Medication PRN MG: at 22:18

## 2019-10-14 RX ADMIN — Medication SCH MG: at 22:18

## 2019-10-14 RX ADMIN — GUAIFENESIN SCH ML: 100 SOLUTION ORAL at 18:24

## 2019-10-14 NOTE — HP
CIWA Score


Nausea/Vomitin-No Nausea/No Vomiting


Muscle Tremors: 3


Anxiety: 1-Mildly Anxious


Agitation: 1-Slight > Activity


Paroxysmal Sweats: 3 (Increased facial moisture)


Orientation: 0-Oriented


Tacttile Disturbances: 0-None


Auditory Disturbances: 0-None


Visual Disturbances: 0-None


Headache: 0-None Present


CIWA-Ar Total Score: 8





- Admission Criteria


[facility] Guidelines: Admission for Medically Managed Detox: 


Requires at least one of the followin. CIWA greater than 12


2. Seizures within the past 24 hours


3. Delirium tremens within the past 24 hours


4. Hallucinations within the past 24 hours


5. Acute intervention needed for co  occurring medical disorder


6. Acute intervention needed for co  occurring psychiatric disorder


7. Severe withdrawal that cannot be handled at a lower level of care (continued


    vomiting, continued diarrhea, abnormal vital signs) requiring intravenous


    medication and/or fluids


8. Pregnancy





Patient presents the following: Acute intervention needed for co-occurring med 

or psych disorder (LEANDER: 0.059;   T: 100.F)


Admission Criteria Met: Admission criteria met





Admitting History and Physical





- Smoking History


Smoking history: Former smoker


Have you smoked in the past 12 months: No


Aproximately how many cigarettes per day: 7





- Alcohol/Substance Use


Hx Alcohol Use: Yes





Admission ROS S





- HPI


Chief Complaint: 





Here for alcohol detox. I need to stop these drugs. 


Allergies/Adverse Reactions: 


 Allergies











Allergy/AdvReac Type Severity Reaction Status Date / Time


 


No Known Allergies Allergy   Verified 10/14/19 14:28











History of Present Illness: 


 33 yo presents w/ alcohol withdrawal seeking detox. 





LEANDER: 0.059


UTox: Neg





Patient w/ low CIWA but has alcohol on board, which masks the withdrawal 

symptoms. Patient w/ noisy cough and elevated temp.


Patient will be admitted and detox started later today, when withdrawal 

symptoms presents.





Denies seizures, overdoses.


Blackouts from drinking - last years ago.





Discharged 19. States started drinking the next day.





Alcohol use since age 18. Currently drinks 40oz x 4 beers/day. State last drink 

about 2 hours ago. Wakes up and drinks. 


PCP use since age 26. Smokes about 2-3x/week


Marijuana use since age 18. Smokes every other day





Nicotine use since age [age].  Currently smokes 1/2 PPD.





PMHx: Asthma as a child. 





RPR: 19:  Nonreactive


QFT: : Negative





MHHx: Denies depression. Denies thoughts of harming self or others.





SHx: Lives w/ family. Unemployed. Currently has legal issues.














Patient Name: Yahir Hadley YOB: 1985


 


Address: [address] [place] [address] Sex: Male














 Rx Written Rx Dispensed Drug Quantity Days Supply Prescriber Name  


 


2019 chlordiazepoxide 25 mg capsule  18 3 Nereyda Aguilar  


 


2019 chlordiazepoxide 25 mg capsule  18 3 Miguel A Moe  














Exam Limitations: No Limitations





- Ebola screening


Have you traveled outside of the country in the last 21 days: No


Have you had contact with anyone from an Ebola affected area: No


Have you been sick,other than usual withdrawal symptoms: No


Do you have a fever: No





- Review of Systems


Constitutional: No Symptoms Reported


EENT: reports: No Symptoms Reported


Respiratory: reports: No Symptoms reported


Cardiac: reports: No Symptoms Reported


GI: reports: No Symptoms Reported


: reports: No Symptoms Reported


Musculoskeletal: reports: No Symptoms Reported


Integumentary: reports: Other (Old bruie over (L) eye)


Neuro: reports: No Symptoms reported


Endocrine: reports: No Symptoms Reported


Hematology: reports: No Symptoms Reported


Psychiatric: reports: Orientated x3





Patient History





- Patient Medical History


Hx Anemia: No


Hx Asthma: No


Hx Chronic Obstructive Pulmonary Disease (COPD): No


Hx Cancer: No


Hx Cardiac Disorders: No


Hx Congestive Heart Failure: No


Hx Hypertension: No


Hx Hypercholesterolemia: No


Hx Pacemaker: No


HX Cerebrovascular Accident: No


Hx Seizures: No


Hx Dementia: No


Hx Diabetes: No


Hx Gastrointestinal Disorders: No


Hx Liver Disease: No


Hx Genitourinary Disorders: No


Hx Sexually Transmitted Disorders: No


Hx Renal Disease (ESRD): No


Hx Thyroid Disease: No


Hx Human Immunodeficiency Virus (HIV): No


Hx Hepatitis C: No


Hx Depression: No


Hx Suicide Attempt: No


Hx Bipolar Disorder: No


Hx Schizophrenia: No





- Patient Surgical History


Past Surgical History: Yes


Hx Neurologic Surgery: Yes (7 YRS BACK H/O EVACUATION OF SDH.)


Hx Cataract Extraction: No


Hx Cardiac Surgery: No


Hx Lung Surgery: No


Hx Breast Surgery: No


Hx Breast Biopsy: No


Hx Abdominal Surgery: No


Hx Appendectomy: No


Hx Cholecystectomy: No


Hx Genitourinary Surgery: No


Hx  Section: No


Hx Orthopedic Surgery: No


Other Surgical History: surg to left side brain to remove clot; 4 yrs ago


Anesthesia Reaction: No





- PPD History


Previous Implant?: No (TB Gold (QFT) 19)


Documented Results: Negative w/proof


Implanted On Prior [facility]mission?: Yes


Date: 19 (TB Gold (QFT))


PPD to be Administered?: No





- Smoking Cessation


Smoking history: Current every day smoker


Have you smoked in the past 12 months: Yes


Aproximately how many cigarettes per day: 10


Hx Chewing Tobacco Use: No


Initiated information on smoking cessation: Yes


'Breaking Loose' booklet given: 10/14/19





- Substance & Tx. History


Hx Alcohol Use: Yes


Hx Substance Use: Yes


Substance Use Type: Alcohol, Marijuana, Tranquilizers (PCP)


Hx Substance Use Treatment: Yes (detox, rehab)





- Substances abused


  ** Alcohol


Substance route: Oral


Frequency: Daily


Amount used: (4) 40oz beers


Age of first use: 18


Date of last use: 10/14/19





  ** Marijuana/Hashish


Substance route: Smoking


Frequency: 3-6 times per week


Amount used: $20


Age of first use: 18


Date of last use: 10/13/19





  ** PCP


Substance route: Smoking


Frequency: 3-6 times per week


Amount used: 2 blunts


Age of first use: 26


Date of last use: 10/14/19





Admission Physical Exam [facility]





- Vital Signs


Vital Signs: 


 Vital Signs - 24 hr











  10/14/19





  14:27


 


Temperature 100.0 F H


 


Pulse Rate 81


 


Respiratory 20





Rate 


 


Blood Pressure 156/82














- Physical


General Appearance: Yes: Nourished, Mild Distress, Alcohol on Breath (LEANDER: 0.59)


HEENTM: Yes: EOMI (Jerking movement of eyes upon (R) lateral gaze), Hearing 

grossly Normal, Normocephalic, Normal Voice, PHONG, Pharynx Normal


Respiratory: Yes: Lungs Clear (Pulse Ox = 97 %), Normal Breath Sounds, No 

Respiratory Distress, Other (Noisy cough productive of whitish phlegm)


Neck: Yes: No masses,lesions,Nodules, Supple


Breast: Yes: Breast Exam Deferred


Cardiology: Yes: Regular Rhythm, Regular Rate, S1, S2


Abdominal: Yes: Non Tender, Flat, Soft, Increased Bowel Sounds


Genitourinary: Yes: Within Normal Limits


Back: Yes: Within Normal Limits


Musculoskeletal: Yes: full range of Motion, Gait Steady


Extremities: Yes: Normal Capillary Refill, Normal Range of Motion, Tremors (

Mild tremors w/ arm extension)


Neurological: Yes: CNs II-XII NML intact (Jerking movement of eyes upon (R) 

lateral gaze), Fully Oriented, Alert, Motor Strength 5/5, Normal Response


Integumentary: Yes: Normal Color, Dry (Decreased skin turgor), Warm, Other (

Scattered healing abrasions on (L) eyebrow area, (L) elbow, hands and both knees

)


Lymphatic: Yes: Within Normal Limits





- Diagnostic


(1) Elevated temperature


Current Visit: Yes   Status: Acute   





(2) Cough


Current Visit: Yes   Status: Acute   





(3) At risk for dehydration


Current Visit: Yes   Status: Acute   





(4) Alcohol dependence with uncomplicated withdrawal


Current Visit: Yes   Status: Acute   





(5) Nicotine dependence


Current Visit: Yes   Status: Chronic   


Qualifiers: 


   Nicotine product type: cigarettes   Substance use status: uncomplicated   

Qualified Code(s): F17.210 - Nicotine dependence, cigarettes, uncomplicated   





(6) PCP dependence


Current Visit: Yes   Status: Suspected   





(7) Unspecified nystagmus


Current Visit: Yes   Status: Acute   





Cleared for Admission S





- Detox or Rehab


[facility] Level of Care: Medically Managed


Detox Regimen/Protocol: Librium


Claeared for Rehab Admission: No





Breathalyzer





- Breathalyzer


Breathalyzer: 0.059





Urine Drug Screen





- Test Device


Lot number: [id number]


Expiration date: 21





- Control


Is test valid?: Yes





- Results


Drug screen NEGATIVE: Yes





Inpatient Rehab Admission





- Rehab Decision to Admit


Inpatient rehab admission?: No

## 2019-10-15 LAB
ALBUMIN SERPL-MCNC: 3.7 G/DL (ref 3.4–5)
ALP SERPL-CCNC: 63 U/L (ref 45–117)
ALT SERPL-CCNC: 76 U/L (ref 13–61)
ANION GAP SERPL CALC-SCNC: 4 MMOL/L (ref 8–16)
APPEARANCE UR: CLEAR
AST SERPL-CCNC: 61 U/L (ref 15–37)
BILIRUB SERPL-MCNC: 0.6 MG/DL (ref 0.2–1)
BILIRUB UR STRIP.AUTO-MCNC: NEGATIVE MG/DL
BUN SERPL-MCNC: 12.1 MG/DL (ref 7–18)
CALCIUM SERPL-MCNC: 8.8 MG/DL (ref 8.5–10.1)
CHLORIDE SERPL-SCNC: 106 MMOL/L (ref 98–107)
CO2 SERPL-SCNC: 28 MMOL/L (ref 21–32)
COLOR UR: YELLOW
CREAT SERPL-MCNC: 0.8 MG/DL (ref 0.55–1.3)
DEPRECATED RDW RBC AUTO: 15.4 % (ref 11.9–15.9)
GLUCOSE SERPL-MCNC: 107 MG/DL (ref 74–106)
HCT VFR BLD CALC: 40 % (ref 35.4–49)
HGB BLD-MCNC: 12.4 GM/DL (ref 11.7–16.9)
KETONES UR QL STRIP: NEGATIVE
LEUKOCYTE ESTERASE UR QL STRIP.AUTO: NEGATIVE
MCH RBC QN AUTO: 22.5 PG (ref 25.7–33.7)
MCHC RBC AUTO-ENTMCNC: 30.9 G/DL (ref 32–35.9)
MCV RBC: 72.8 FL (ref 80–96)
NITRITE UR QL STRIP: NEGATIVE
PH UR: 7 [PH] (ref 5–8)
PLATELET # BLD AUTO: 253 K/MM3 (ref 134–434)
PMV BLD: 9.1 FL (ref 7.5–11.1)
POTASSIUM SERPLBLD-SCNC: 4 MMOL/L (ref 3.5–5.1)
PROT SERPL-MCNC: 6.8 G/DL (ref 6.4–8.2)
PROT UR QL STRIP: NEGATIVE
PROT UR QL STRIP: NEGATIVE
RBC # BLD AUTO: 5.49 M/MM3 (ref 4–5.6)
SODIUM SERPL-SCNC: 138 MMOL/L (ref 136–145)
SP GR UR: 1.02 (ref 1.01–1.03)
UROBILINOGEN UR STRIP-MCNC: 0.2 MG/DL (ref 0.2–1)
WBC # BLD AUTO: 7.6 K/MM3 (ref 4–10)

## 2019-10-15 RX ADMIN — GUAIFENESIN SCH ML: 100 SOLUTION ORAL at 12:29

## 2019-10-15 RX ADMIN — Medication PRN MG: at 22:16

## 2019-10-15 RX ADMIN — NICOTINE SCH: 14 PATCH, EXTENDED RELEASE TRANSDERMAL at 10:26

## 2019-10-15 RX ADMIN — GUAIFENESIN SCH ML: 100 SOLUTION ORAL at 22:48

## 2019-10-15 RX ADMIN — Medication SCH TAB: at 10:25

## 2019-10-15 RX ADMIN — Medication SCH MG: at 22:15

## 2019-10-15 RX ADMIN — GUAIFENESIN SCH ML: 100 SOLUTION ORAL at 18:04

## 2019-10-15 RX ADMIN — GUAIFENESIN SCH ML: 100 SOLUTION ORAL at 06:03

## 2019-10-15 NOTE — PN
S CIWA





- CIWA Score


Nausea/Vomitin-No Nausea/No Vomiting


Muscle Tremors: 3


Anxiety: 3


Agitation: 2


Paroxysmal Sweats: 2


Orientation: 0-Oriented


Tacttile Disturbances: 0-None


Auditory Disturbances: 2-Mild Harshness/Frighten


Visual Disturbances: 1-Very Mild Sensitivity


Headache: 0-None Present


CIWA-Ar Total Score: 13





[facility] Progress Note (SOAP)


Subjective: 





Sweating, Anxious, Tremors, Poor Appetite.


Objective: 


PATIENT A & O X 3. IN NO ACUTE DISTRESS.





10/15/19 15:03


 Vital Signs











Temperature  97.5 F L  10/15/19 13:11


 


Pulse Rate  63   10/15/19 13:11


 


Respiratory Rate  18   10/15/19 13:11


 


Blood Pressure  130/81   10/15/19 13:11


 


O2 Sat by Pulse Oximetry (%)      








 Laboratory Tests











  10/15/19 10/15/19 10/15/19





  07:50 07:50 12:00


 


WBC  7.6  


 


RBC  5.49  


 


Hgb  12.4  


 


Hct  40.0  


 


MCV  72.8 L  


 


MCH  22.5 L  


 


MCHC  30.9 L  


 


RDW  15.4  


 


Plt Count  253  


 


MPV  9.1  


 


Sodium   138 


 


Potassium   4.0 


 


Chloride   106 


 


Carbon Dioxide   28 


 


Anion Gap   4 L 


 


BUN   12.1 


 


Creatinine   0.8 


 


Est GFR (CKD-EPI)AfAm   134.14 


 


Est GFR (CKD-EPI)NonAf   115.74 


 


Random Glucose   107 H 


 


Calcium   8.8 


 


Total Bilirubin   0.6 


 


AST   61 H 


 


ALT   76 H 


 


Alkaline Phosphatase   63 


 


Total Protein   6.8 


 


Albumin   3.7 


 


Urine Color    Yellow


 


Urine Appearance    Clear


 


Urine pH    7.0  D


 


Ur Specific Gravity    1.025


 


Urine Protein    Negative


 


Urine Glucose (UA)    Negative


 


Urine Ketones    Negative


 


Urine Blood    Negative


 


Urine Nitrite    Negative


 


Urine Bilirubin    Negative


 


Urine Urobilinogen    0.2


 


Ur Leukocyte Esterase    Negative








LABS NOTED.


Assessment: 





10/15/19 15:04


WITHDRAWAL SYMPTOMS.


ELEVATED AST LEVEL.


ELEVATED ALT LEVEL.





10/15/19 15:04


Plan: 





CONTINUE DETOX.


ENSURE PO FOR CALORIC SUPPLEMENTATION.

## 2019-10-16 RX ADMIN — Medication SCH MG: at 22:17

## 2019-10-16 RX ADMIN — NICOTINE SCH: 14 PATCH, EXTENDED RELEASE TRANSDERMAL at 10:29

## 2019-10-16 RX ADMIN — GUAIFENESIN SCH ML: 100 SOLUTION ORAL at 05:35

## 2019-10-16 RX ADMIN — GUAIFENESIN SCH ML: 100 SOLUTION ORAL at 22:49

## 2019-10-16 RX ADMIN — Medication PRN MG: at 22:19

## 2019-10-16 RX ADMIN — Medication SCH TAB: at 10:29

## 2019-10-16 RX ADMIN — GUAIFENESIN SCH ML: 100 SOLUTION ORAL at 12:25

## 2019-10-16 RX ADMIN — GUAIFENESIN SCH ML: 100 SOLUTION ORAL at 17:27

## 2019-10-16 NOTE — PN
S CIWA





- CIWA Score


Nausea/Vomitin-Mild Nausea/No Vomiting


Muscle Tremors: 2


Anxiety: 3


Agitation: 2


Paroxysmal Sweats: 2


Orientation: 0-Oriented


Tacttile Disturbances: 1-Very Mild Itch/Numbness


Auditory Disturbances: 0-None


Visual Disturbances: 0-None


Headache: 1-Very Mild


CIWA-Ar Total Score: 12





[facility] Progress Note (SOAP)


Subjective: 





c/o of chills, interrupted sleep, body aches 


Objective: 





10/16/19 15:29


 Vital Signs











Temperature  96.4 F L  10/16/19 13:12


 


Pulse Rate  73   10/16/19 13:12


 


Respiratory Rate  18   10/16/19 13:12


 


Blood Pressure  125/68   10/16/19 13:12


 


O2 Sat by Pulse Oximetry (%)      








 Laboratory Last Values











WBC  7.6 K/mm3 (4.0-10.0)   10/15/19  07:50    


 


RBC  5.49 M/mm3 (4.00-5.60)   10/15/19  07:50    


 


Hgb  12.4 GM/dL (11.7-16.9)   10/15/19  07:50    


 


Hct  40.0 % (35.4-49)   10/15/19  07:50    


 


MCV  72.8 fl (80-96)  L  10/15/19  07:50    


 


MCH  22.5 pg (25.7-33.7)  L  10/15/19  07:50    


 


MCHC  30.9 g/dl (32.0-35.9)  L  10/15/19  07:50    


 


RDW  15.4 % (11.9-15.9)   10/15/19  07:50    


 


Plt Count  253 K/MM3 (134-434)   10/15/19  07:50    


 


MPV  9.1 fl (7.5-11.1)   10/15/19  07:50    


 


Sodium  138 mmol/L (136-145)   10/15/19  07:50    


 


Potassium  4.0 mmol/L (3.5-5.1)   10/15/19  07:50    


 


Chloride  106 mmol/L ()   10/15/19  07:50    


 


Carbon Dioxide  28 mmol/L (21-32)   10/15/19  07:50    


 


Anion Gap  4 MMOL/L (8-16)  L  10/15/19  07:50    


 


BUN  12.1 mg/dL (7-18)   10/15/19  07:50    


 


Creatinine  0.8 mg/dL (0.55-1.3)   10/15/19  07:50    


 


Est GFR (CKD-EPI)AfAm  134.14   10/15/19  07:50    


 


Est GFR (CKD-EPI)NonAf  115.74   10/15/19  07:50    


 


Random Glucose  107 mg/dL ()  H  10/15/19  07:50    


 


Calcium  8.8 mg/dL (8.5-10.1)   10/15/19  07:50    


 


Total Bilirubin  0.6 mg/dL (0.2-1)   10/15/19  07:50    


 


AST  61 U/L (15-37)  H  10/15/19  07:50    


 


ALT  76 U/L (13-61)  H  10/15/19  07:50    


 


Alkaline Phosphatase  63 U/L ()   10/15/19  07:50    


 


Total Protein  6.8 g/dl (6.4-8.2)   10/15/19  07:50    


 


Albumin  3.7 g/dl (3.4-5.0)   10/15/19  07:50    


 


Urine Color  Yellow   10/15/19  12:00    


 


Urine Appearance  Clear   10/15/19  12:00    


 


Urine pH  7.0  (5.0-8.0)  D 10/15/19  12:00    


 


Ur Specific Gravity  1.025  (1.010-1.035)   10/15/19  12:00    


 


Urine Protein  Negative  (NEGATIVE)   10/15/19  12:00    


 


Urine Glucose (UA)  Negative  (NEGATIVE)   10/15/19  12:00    


 


Urine Ketones  Negative  (NEGATIVE)   10/15/19  12:00    


 


Urine Blood  Negative  (NEGATIVE)   10/15/19  12:00    


 


Urine Nitrite  Negative  (NEGATIVE)   10/15/19  12:00    


 


Urine Bilirubin  Negative  (NEGATIVE)   10/15/19  12:00    


 


Urine Urobilinogen  0.2 mg/dL (0.2-1.0)   10/15/19  12:00    


 


Ur Leukocyte Esterase  Negative  (NEGATIVE)   10/15/19  12:00    











Assessment: 





10/16/19 15:29


Aox3 no acute distress 


ambulating in the unit 





withdrawal sx 


Plan: 





increase PO fluids 


continue detox 


continue to monitor

## 2019-10-17 RX ADMIN — Medication PRN MG: at 22:23

## 2019-10-17 RX ADMIN — GUAIFENESIN SCH ML: 100 SOLUTION ORAL at 12:26

## 2019-10-17 RX ADMIN — Medication SCH TAB: at 10:35

## 2019-10-17 RX ADMIN — GUAIFENESIN SCH ML: 100 SOLUTION ORAL at 05:48

## 2019-10-17 RX ADMIN — NICOTINE SCH: 14 PATCH, EXTENDED RELEASE TRANSDERMAL at 10:35

## 2019-10-17 RX ADMIN — Medication SCH MG: at 22:23

## 2019-10-18 VITALS — DIASTOLIC BLOOD PRESSURE: 81 MMHG | SYSTOLIC BLOOD PRESSURE: 122 MMHG | TEMPERATURE: 97.2 F | HEART RATE: 97 BPM

## 2019-10-18 NOTE — DS
[facility] Detox Discharge Summary


Admission Date: 


10/14/19





Discharge Date: 10/18/19





- History


Present History: Alcohol Dependence


Additional Comments: 





Patient tolerated detox well. Follow up with next level of care at [facility]. 

Follow up with PCP Dr. Javed [phone]. If worsening symptoms are present 

seek medical attention.


Pertinent Past History: 





Elevated LFTS 


Nicotine Dependence


 Vital Signs











Temperature  97.2 F L  10/18/19 09:09


 


Pulse Rate  97 H  10/18/19 09:09


 


Respiratory Rate  18   10/18/19 09:09


 


Blood Pressure  122/81   10/18/19 09:09


 


O2 Sat by Pulse Oximetry (%)      








 Laboratory Last Values











WBC  7.6 K/mm3 (4.0-10.0)   10/15/19  07:50    


 


RBC  5.49 M/mm3 (4.00-5.60)   10/15/19  07:50    


 


Hgb  12.4 GM/dL (11.7-16.9)   10/15/19  07:50    


 


Hct  40.0 % (35.4-49)   10/15/19  07:50    


 


MCV  72.8 fl (80-96)  L  10/15/19  07:50    


 


MCH  22.5 pg (25.7-33.7)  L  10/15/19  07:50    


 


MCHC  30.9 g/dl (32.0-35.9)  L  10/15/19  07:50    


 


RDW  15.4 % (11.9-15.9)   10/15/19  07:50    


 


Plt Count  253 K/MM3 (134-434)   10/15/19  07:50    


 


MPV  9.1 fl (7.5-11.1)   10/15/19  07:50    


 


Sodium  138 mmol/L (136-145)   10/15/19  07:50    


 


Potassium  4.0 mmol/L (3.5-5.1)   10/15/19  07:50    


 


Chloride  106 mmol/L ()   10/15/19  07:50    


 


Carbon Dioxide  28 mmol/L (21-32)   10/15/19  07:50    


 


Anion Gap  4 MMOL/L (8-16)  L  10/15/19  07:50    


 


BUN  12.1 mg/dL (7-18)   10/15/19  07:50    


 


Creatinine  0.8 mg/dL (0.55-1.3)   10/15/19  07:50    


 


Est GFR (CKD-EPI)AfAm  134.14   10/15/19  07:50    


 


Est GFR (CKD-EPI)NonAf  115.74   10/15/19  07:50    


 


Random Glucose  107 mg/dL ()  H  10/15/19  07:50    


 


Calcium  8.8 mg/dL (8.5-10.1)   10/15/19  07:50    


 


Total Bilirubin  0.6 mg/dL (0.2-1)   10/15/19  07:50    


 


AST  61 U/L (15-37)  H  10/15/19  07:50    


 


ALT  76 U/L (13-61)  H  10/15/19  07:50    


 


Alkaline Phosphatase  63 U/L ()   10/15/19  07:50    


 


Total Protein  6.8 g/dl (6.4-8.2)   10/15/19  07:50    


 


Albumin  3.7 g/dl (3.4-5.0)   10/15/19  07:50    


 


Urine Color  Yellow   10/15/19  12:00    


 


Urine Appearance  Clear   10/15/19  12:00    


 


Urine pH  7.0  (5.0-8.0)  D 10/15/19  12:00    


 


Ur Specific Gravity  1.025  (1.010-1.035)   10/15/19  12:00    


 


Urine Protein  Negative  (NEGATIVE)   10/15/19  12:00    


 


Urine Glucose (UA)  Negative  (NEGATIVE)   10/15/19  12:00    


 


Urine Ketones  Negative  (NEGATIVE)   10/15/19  12:00    


 


Urine Blood  Negative  (NEGATIVE)   10/15/19  12:00    


 


Urine Nitrite  Negative  (NEGATIVE)   10/15/19  12:00    


 


Urine Bilirubin  Negative  (NEGATIVE)   10/15/19  12:00    


 


Urine Urobilinogen  0.2 mg/dL (0.2-1.0)   10/15/19  12:00    


 


Ur Leukocyte Esterase  Negative  (NEGATIVE)   10/15/19  12:00    











Aox3 no acute distress 


EENT WNL 


Full ROM no gait disturbance 


No edema or erythema 





- Physical Exam Results


Vital Signs: 


 Vital Signs











Temperature  97.6 F   10/18/19 06:22


 


Pulse Rate  53 L  10/18/19 06:22


 


Respiratory Rate  18   10/18/19 06:22


 


Blood Pressure  110/66   10/18/19 06:22


 


O2 Sat by Pulse Oximetry (%)      














- Treatment


Hospital Course: Detox Protocol Followed, Detoxed Safely, Responded well, 

Discharged Condition Good, Rehab Referral Accepted


Patient has Accepted a Rehab Referral to: [facility] 





- Medication


Discharge Medications: 


Ambulatory Orders





NK [No Known Home Medication]  09/21/17 











- Diagnosis


(1) Alcohol dependence with uncomplicated withdrawal


Status: Acute   





(2) Elevated alanine aminotransferase (ALT) level


Status: Acute   





(3) Elevated aspartate aminotransferase level


Status: Acute   





(4) Nicotine dependence


Status: Chronic   


Qualifiers: 


   Nicotine product type: cigarettes   Substance use status: uncomplicated   

Qualified Code(s): F17.210 - Nicotine dependence, cigarettes, uncomplicated   





- AMA


Did Patient Leave Against Medical Advice: No